# Patient Record
Sex: FEMALE | Race: WHITE | Employment: OTHER | URBAN - METROPOLITAN AREA
[De-identification: names, ages, dates, MRNs, and addresses within clinical notes are randomized per-mention and may not be internally consistent; named-entity substitution may affect disease eponyms.]

---

## 2018-01-11 ENCOUNTER — OFFICE VISIT (OUTPATIENT)
Dept: INTERNAL MEDICINE CLINIC | Facility: CLINIC | Age: 58
End: 2018-01-11

## 2018-01-11 VITALS
SYSTOLIC BLOOD PRESSURE: 118 MMHG | HEIGHT: 68 IN | DIASTOLIC BLOOD PRESSURE: 78 MMHG | BODY MASS INDEX: 33.34 KG/M2 | OXYGEN SATURATION: 94 % | WEIGHT: 220 LBS | HEART RATE: 98 BPM

## 2018-01-11 DIAGNOSIS — M25.552 PAIN OF LEFT HIP JOINT: ICD-10-CM

## 2018-01-11 DIAGNOSIS — K21.9 GASTROESOPHAGEAL REFLUX DISEASE, ESOPHAGITIS PRESENCE NOT SPECIFIED: ICD-10-CM

## 2018-01-11 DIAGNOSIS — M25.562 CHRONIC PAIN OF LEFT KNEE: ICD-10-CM

## 2018-01-11 DIAGNOSIS — R06.00 PND (PAROXYSMAL NOCTURNAL DYSPNEA): ICD-10-CM

## 2018-01-11 DIAGNOSIS — G89.29 CHRONIC PAIN OF LEFT KNEE: ICD-10-CM

## 2018-01-11 DIAGNOSIS — G43.109 MIGRAINE WITH AURA AND WITHOUT STATUS MIGRAINOSUS, NOT INTRACTABLE: ICD-10-CM

## 2018-01-11 DIAGNOSIS — R06.83 SNORING: ICD-10-CM

## 2018-01-11 DIAGNOSIS — R06.00 EXERTIONAL DYSPNEA: Primary | ICD-10-CM

## 2018-01-11 DIAGNOSIS — Z00.00 PREVENTATIVE HEALTH CARE: ICD-10-CM

## 2018-01-11 DIAGNOSIS — R53.83 OTHER FATIGUE: ICD-10-CM

## 2018-01-11 LAB
ALBUMIN SERPL BCP-MCNC: 4.1 G/DL (ref 3.5–4.8)
ALBUMIN/GLOB SERPL: 1.2 {RATIO} (ref 1–2)
ALP SERPL-CCNC: 104 U/L (ref 32–100)
ALT SERPL-CCNC: 20 U/L (ref 14–54)
ANION GAP SERPL CALC-SCNC: 6 MMOL/L (ref 0–18)
AST SERPL-CCNC: 23 U/L (ref 15–41)
BACTERIA UR QL AUTO: NEGATIVE /HPF
BASOPHILS # BLD: 0 K/UL (ref 0–0.2)
BASOPHILS NFR BLD: 0 %
BILIRUB SERPL-MCNC: 0.5 MG/DL (ref 0.3–1.2)
BILIRUB UR QL: NEGATIVE
BUN SERPL-MCNC: 13 MG/DL (ref 8–20)
BUN/CREAT SERPL: 12.5 (ref 10–20)
CALCIUM SERPL-MCNC: 9.5 MG/DL (ref 8.5–10.5)
CHLORIDE SERPL-SCNC: 107 MMOL/L (ref 95–110)
CHOLEST SERPL-MCNC: 190 MG/DL (ref 110–200)
CLARITY UR: CLEAR
CO2 SERPL-SCNC: 25 MMOL/L (ref 22–32)
COLOR UR: YELLOW
CREAT SERPL-MCNC: 1.04 MG/DL (ref 0.5–1.5)
EOSINOPHIL # BLD: 0.3 K/UL (ref 0–0.7)
EOSINOPHIL NFR BLD: 3 %
ERYTHROCYTE [DISTWIDTH] IN BLOOD BY AUTOMATED COUNT: 13.6 % (ref 11–15)
GLOBULIN PLAS-MCNC: 3.5 G/DL (ref 2.5–3.7)
GLUCOSE SERPL-MCNC: 95 MG/DL (ref 70–99)
GLUCOSE UR-MCNC: NEGATIVE MG/DL
HCT VFR BLD AUTO: 45.4 % (ref 35–48)
HDLC SERPL-MCNC: 41 MG/DL
HGB BLD-MCNC: 15 G/DL (ref 12–16)
HGB UR QL STRIP.AUTO: NEGATIVE
KETONES UR-MCNC: NEGATIVE MG/DL
LDLC SERPL CALC-MCNC: 110 MG/DL (ref 0–99)
LYMPHOCYTES # BLD: 1.9 K/UL (ref 1–4)
LYMPHOCYTES NFR BLD: 23 %
MCH RBC QN AUTO: 28.5 PG (ref 27–32)
MCHC RBC AUTO-ENTMCNC: 33 G/DL (ref 32–37)
MCV RBC AUTO: 86.3 FL (ref 80–100)
MONOCYTES # BLD: 0.8 K/UL (ref 0–1)
MONOCYTES NFR BLD: 9 %
NEUTROPHILS # BLD AUTO: 5.3 K/UL (ref 1.8–7.7)
NEUTROPHILS NFR BLD: 64 %
NITRITE UR QL STRIP.AUTO: NEGATIVE
NONHDLC SERPL-MCNC: 149 MG/DL
OSMOLALITY UR CALC.SUM OF ELEC: 286 MOSM/KG (ref 275–295)
PH UR: 5 [PH] (ref 5–8)
PLATELET # BLD AUTO: 173 K/UL (ref 140–400)
PMV BLD AUTO: 10.6 FL (ref 7.4–10.3)
POTASSIUM SERPL-SCNC: 4.3 MMOL/L (ref 3.3–5.1)
PROT SERPL-MCNC: 7.6 G/DL (ref 5.9–8.4)
PROT UR-MCNC: NEGATIVE MG/DL
RBC # BLD AUTO: 5.26 M/UL (ref 3.7–5.4)
RBC #/AREA URNS AUTO: 1 /HPF
SODIUM SERPL-SCNC: 138 MMOL/L (ref 136–144)
SP GR UR STRIP: 1.02 (ref 1–1.03)
TRIGL SERPL-MCNC: 196 MG/DL (ref 1–149)
TSH SERPL-ACNC: 3.24 UIU/ML (ref 0.45–5.33)
UROBILINOGEN UR STRIP-ACNC: <2
VIT C UR-MCNC: NEGATIVE MG/DL
WBC # BLD AUTO: 8.3 K/UL (ref 4–11)
WBC #/AREA URNS AUTO: 9 /HPF

## 2018-01-11 PROCEDURE — 36415 COLL VENOUS BLD VENIPUNCTURE: CPT | Performed by: INTERNAL MEDICINE

## 2018-01-11 PROCEDURE — 99386 PREV VISIT NEW AGE 40-64: CPT | Performed by: INTERNAL MEDICINE

## 2018-01-11 RX ORDER — PANTOPRAZOLE SODIUM 20 MG/1
20 TABLET, DELAYED RELEASE ORAL
Qty: 30 TABLET | Refills: 2 | Status: SHIPPED | OUTPATIENT
Start: 2018-01-11 | End: 2018-11-06

## 2018-01-11 RX ORDER — CALCIUM CARBONATE 200(500)MG
1 TABLET,CHEWABLE ORAL DAILY
COMMUNITY
End: 2018-02-20

## 2018-01-11 NOTE — PROGRESS NOTES
HPI:    Patient ID: Genia Zuluaga is a 62year old female. HPI   Pt comes in for first time to establish care. Patient is a daughter of patient of mine.   She comes in today with complaint of exertional dyspnea with occasional chest pain on and off is b Prolapse of mitral valve    • Ventricular tachycardia (HCC)       Past Surgical History:  No date: OTHER SURGICAL HISTORY      Comment: nose repair   Family History   Problem Relation Age of Onset   • Cancer Father       Social History: Smoking status: Nev Free T4 [E], specimen      Urinalysis, Routine, specimen    Meds This Visit:  Signed Prescriptions Disp Refills    Pantoprazole Sodium 20 MG Oral Tab EC 30 tablet 2      Sig: Take 1 tablet (20 mg total) by mouth every morning before breakfast.           Im

## 2018-01-25 ENCOUNTER — TELEPHONE (OUTPATIENT)
Dept: INTERNAL MEDICINE CLINIC | Facility: CLINIC | Age: 58
End: 2018-01-25

## 2018-01-29 ENCOUNTER — HOSPITAL ENCOUNTER (OUTPATIENT)
Dept: CV DIAGNOSTICS | Facility: HOSPITAL | Age: 58
Discharge: HOME | End: 2018-01-29
Attending: INTERNAL MEDICINE
Payer: COMMERCIAL

## 2018-01-29 ENCOUNTER — APPOINTMENT (OUTPATIENT)
Dept: CV DIAGNOSTICS | Facility: HOSPITAL | Age: 58
End: 2018-01-29
Attending: INTERNAL MEDICINE
Payer: COMMERCIAL

## 2018-01-29 DIAGNOSIS — R94.39 ABNORMAL STRESS ECHO: Primary | ICD-10-CM

## 2018-01-29 DIAGNOSIS — R06.00 DYSPNEA ON EXERTION: ICD-10-CM

## 2018-01-29 DIAGNOSIS — R06.00 DYSPNEA, PAROXYSMAL NOCTURNAL: ICD-10-CM

## 2018-01-29 PROCEDURE — 93018 CV STRESS TEST I&R ONLY: CPT | Performed by: INTERNAL MEDICINE

## 2018-01-29 PROCEDURE — 93350 STRESS TTE ONLY: CPT | Performed by: INTERNAL MEDICINE

## 2018-01-29 PROCEDURE — 93016 CV STRESS TEST SUPVJ ONLY: CPT | Performed by: INTERNAL MEDICINE

## 2018-01-29 PROCEDURE — 93017 CV STRESS TEST TRACING ONLY: CPT | Performed by: INTERNAL MEDICINE

## 2018-01-29 RX ORDER — SODIUM CHLORIDE 9 MG/ML
INJECTION, SOLUTION INTRAVENOUS
Status: COMPLETED
Start: 2018-01-29 | End: 2018-01-29

## 2018-01-29 RX ORDER — DOBUTAMINE HYDROCHLORIDE 100 MG/100ML
INJECTION INTRAVENOUS
Status: COMPLETED
Start: 2018-01-29 | End: 2018-01-29

## 2018-01-29 RX ADMIN — SODIUM CHLORIDE 120 ML: 9 INJECTION, SOLUTION INTRAVENOUS at 13:45:00

## 2018-01-29 RX ADMIN — DOBUTAMINE HYDROCHLORIDE: 100 INJECTION INTRAVENOUS at 14:37:00

## 2018-01-29 RX ADMIN — DOBUTAMINE HYDROCHLORIDE 30 MCG/KG/MIN: 100 INJECTION INTRAVENOUS at 13:45:00

## 2018-01-29 NOTE — PROGRESS NOTES
Spoke with patient, informed to follow up with cardio. Information for Dr. Moore Ek given to patient.

## 2018-01-29 NOTE — IMAGING NOTE
Pt here as an out pt for a dobutamine stress echo for c/o SOB. See test for full details. c/o chest pain 6(1-10) at peak infusion of 30 mcg/kg/min and B/P dropped from 213'R systolic to 713 systolic, HR dropped from 119 to 80's and  pt was pallor in color.

## 2018-02-20 PROBLEM — R94.39 ABNORMAL STRESS TEST: Status: ACTIVE | Noted: 2018-02-20

## 2018-03-05 ENCOUNTER — HOSPITAL ENCOUNTER (OUTPATIENT)
Dept: INTERVENTIONAL RADIOLOGY/VASCULAR | Facility: HOSPITAL | Age: 58
Discharge: HOME | End: 2018-03-05
Attending: INTERNAL MEDICINE
Payer: COMMERCIAL

## 2018-03-12 ENCOUNTER — HOSPITAL ENCOUNTER (OUTPATIENT)
Dept: INTERVENTIONAL RADIOLOGY/VASCULAR | Facility: HOSPITAL | Age: 58
Discharge: HOME | End: 2018-03-12
Attending: INTERNAL MEDICINE | Admitting: INTERNAL MEDICINE
Payer: COMMERCIAL

## 2018-03-12 VITALS
OXYGEN SATURATION: 92 % | HEART RATE: 66 BPM | BODY MASS INDEX: 33.34 KG/M2 | RESPIRATION RATE: 15 BRPM | DIASTOLIC BLOOD PRESSURE: 64 MMHG | HEIGHT: 68 IN | SYSTOLIC BLOOD PRESSURE: 108 MMHG | WEIGHT: 220 LBS

## 2018-03-12 DIAGNOSIS — R07.9 CHEST PAIN: ICD-10-CM

## 2018-03-12 DIAGNOSIS — R06.02 SOB (SHORTNESS OF BREATH): ICD-10-CM

## 2018-03-12 DIAGNOSIS — R94.39 ABNORMAL STRESS TEST: ICD-10-CM

## 2018-03-12 PROCEDURE — 4A023N7 MEASUREMENT OF CARDIAC SAMPLING AND PRESSURE, LEFT HEART, PERCUTANEOUS APPROACH: ICD-10-PCS | Performed by: INTERNAL MEDICINE

## 2018-03-12 PROCEDURE — 99153 MOD SED SAME PHYS/QHP EA: CPT

## 2018-03-12 PROCEDURE — 99152 MOD SED SAME PHYS/QHP 5/>YRS: CPT

## 2018-03-12 PROCEDURE — B2111ZZ FLUOROSCOPY OF MULTIPLE CORONARY ARTERIES USING LOW OSMOLAR CONTRAST: ICD-10-PCS | Performed by: INTERNAL MEDICINE

## 2018-03-12 PROCEDURE — B2151ZZ FLUOROSCOPY OF LEFT HEART USING LOW OSMOLAR CONTRAST: ICD-10-PCS | Performed by: INTERNAL MEDICINE

## 2018-03-12 PROCEDURE — 93458 L HRT ARTERY/VENTRICLE ANGIO: CPT

## 2018-03-12 RX ORDER — NITROGLYCERIN 20 MG/100ML
INJECTION INTRAVENOUS
Status: COMPLETED
Start: 2018-03-12 | End: 2018-03-12

## 2018-03-12 RX ORDER — SODIUM CHLORIDE 9 MG/ML
INJECTION, SOLUTION INTRAVENOUS
Status: DISPENSED
Start: 2018-03-12 | End: 2018-03-12

## 2018-03-12 RX ORDER — ASPIRIN 81 MG/1
TABLET, CHEWABLE ORAL
Status: DISPENSED
Start: 2018-03-12 | End: 2018-03-12

## 2018-03-12 RX ORDER — SODIUM CHLORIDE 9 MG/ML
INJECTION, SOLUTION INTRAVENOUS CONTINUOUS
Status: DISCONTINUED | OUTPATIENT
Start: 2018-03-12 | End: 2018-03-14

## 2018-03-12 RX ORDER — MIDAZOLAM HYDROCHLORIDE 1 MG/ML
INJECTION INTRAMUSCULAR; INTRAVENOUS
Status: COMPLETED
Start: 2018-03-12 | End: 2018-03-12

## 2018-03-12 RX ORDER — VERAPAMIL HYDROCHLORIDE 2.5 MG/ML
INJECTION, SOLUTION INTRAVENOUS
Status: COMPLETED
Start: 2018-03-12 | End: 2018-03-12

## 2018-03-12 RX ORDER — LIDOCAINE HYDROCHLORIDE 20 MG/ML
INJECTION, SOLUTION EPIDURAL; INFILTRATION; INTRACAUDAL; PERINEURAL
Status: COMPLETED
Start: 2018-03-12 | End: 2018-03-12

## 2018-03-12 RX ORDER — HEPARIN SODIUM 1000 [USP'U]/ML
INJECTION, SOLUTION INTRAVENOUS; SUBCUTANEOUS
Status: COMPLETED
Start: 2018-03-12 | End: 2018-03-12

## 2018-03-12 RX ORDER — SODIUM CHLORIDE 9 MG/ML
INJECTION, SOLUTION INTRAVENOUS CONTINUOUS
Status: ACTIVE | OUTPATIENT
Start: 2018-03-12 | End: 2018-03-12

## 2018-03-12 NOTE — PROGRESS NOTES
Outpatient Surgery Brief Discharge Summary         Patient ID:  Carlos Solorio  [id number]  62year old  10/23/1960    Discharge Diagnoses: cad  Procedures: l,lv,cor    Discharged Condition: stable    Disposition: home    Patient Instructions:  Follow-up wit

## 2018-03-12 NOTE — PROGRESS NOTES
Patient discharged home with discharge instructions. Notified her to call for follow up appointment with Dr. Paul Keyes in 1-2 weeks. Right wrist site no swelling, no bleeding noted. Right sling placed.

## 2018-04-19 ENCOUNTER — TELEPHONE (OUTPATIENT)
Dept: GASTROENTEROLOGY | Facility: CLINIC | Age: 58
End: 2018-04-19

## 2018-04-19 ENCOUNTER — OFFICE VISIT (OUTPATIENT)
Dept: GASTROENTEROLOGY | Facility: CLINIC | Age: 58
End: 2018-04-19

## 2018-04-19 VITALS
WEIGHT: 226 LBS | DIASTOLIC BLOOD PRESSURE: 70 MMHG | SYSTOLIC BLOOD PRESSURE: 107 MMHG | HEIGHT: 68 IN | HEART RATE: 83 BPM | BODY MASS INDEX: 34.25 KG/M2

## 2018-04-19 DIAGNOSIS — Z12.12 SCREENING FOR COLORECTAL CANCER: Primary | ICD-10-CM

## 2018-04-19 DIAGNOSIS — Z80.0 FAMILY HISTORY OF ESOPHAGEAL CANCER: ICD-10-CM

## 2018-04-19 DIAGNOSIS — Z83.79 FAMILY HISTORY OF BARRETT'S ESOPHAGUS: ICD-10-CM

## 2018-04-19 DIAGNOSIS — R12 HEARTBURN: ICD-10-CM

## 2018-04-19 DIAGNOSIS — Z12.11 SCREENING FOR COLON CANCER: Primary | ICD-10-CM

## 2018-04-19 DIAGNOSIS — Z12.11 SCREENING FOR COLORECTAL CANCER: Primary | ICD-10-CM

## 2018-04-19 PROCEDURE — 99244 OFF/OP CNSLTJ NEW/EST MOD 40: CPT | Performed by: INTERNAL MEDICINE

## 2018-04-19 RX ORDER — BUPROPION HYDROCHLORIDE 100 MG/1
TABLET, EXTENDED RELEASE ORAL
Refills: 0 | COMMUNITY
Start: 2018-02-10 | End: 2019-06-12

## 2018-04-19 NOTE — H&P
[facility], [facility] - Gastroenterology                                                                                                  Clinic History and Physical Rfl: 0   DiphenhydrAMINE HCl 50 MG Oral Tab Take 1 tablet 13 hours prior, 7 hours prior, and 1 hour prior to procedure Disp: 3 tablet Rfl: 0   Pantoprazole Sodium 20 MG Oral Tab EC Take 1 tablet (20 mg total) by mouth every morning before breakfast. Disp: pain, perforation, as well as the cardiopulmonary risks of anesthesia and all potentially leading to prolonged hospitalization or surgical intervention. I also mentioned the possibility of missed lesion.  All questions were answered to the patient’s satisfa

## 2018-04-19 NOTE — TELEPHONE ENCOUNTER
Scheduled for:  Colon/EGD  Provider Name: Leena Lema  Date:  6-11-18- Pt added for a Monday.  Is unable to get a ride any other day  Location:  [facility]  Sedation:  MAC  Time:  12:30pm, arrival 11:30am  Prep: Suprep  Meds/Allergies Reconciled?:  yes  Diagnosis with code

## 2018-04-19 NOTE — PATIENT INSTRUCTIONS
1. Schedule colonoscopy and upper endoscopy (EGD) with monitored anesthesia care (MAC) with Dr. John Lauren    2.  bowel prep from pharmacy ([facility] )    3. Continue all medications for procedure    4.  Read all bowel prep instructions carefully

## 2018-06-08 ENCOUNTER — TELEPHONE (OUTPATIENT)
Dept: GASTROENTEROLOGY | Facility: CLINIC | Age: 58
End: 2018-06-08

## 2018-06-08 DIAGNOSIS — Z80.0 FAMILY HISTORY OF ESOPHAGEAL CANCER: ICD-10-CM

## 2018-06-08 DIAGNOSIS — Z83.79 FAMILY HISTORY OF BARRETT'S ESOPHAGUS: ICD-10-CM

## 2018-06-08 DIAGNOSIS — Z12.11 COLON CANCER SCREENING: Primary | ICD-10-CM

## 2018-06-08 DIAGNOSIS — R12 HEARTBURN: ICD-10-CM

## 2018-06-08 NOTE — TELEPHONE ENCOUNTER
Pt stated nurse who stays with mother isn't able to stay with mother no one to watch her so pt has to cancel CLN/EGD 6/11/18.     Canceled in EPIC, Surgical Case Change Request sent, forwarded to GI schedulers     GI schedulers- Please reschedule    Talon Lima

## 2018-06-09 NOTE — TELEPHONE ENCOUNTER
Emily Kelley thank you for letting me know.  Yes please reschedule    Antonia Samson MD  [facility], [facility] - Gastroenterology  6/8/2018  8:39 PM

## 2018-06-12 NOTE — TELEPHONE ENCOUNTER
Scheduled for:  Colonoscopy [address] and EGD [address]  Provider Name: Dr. Robin Barbosa  Date:  8/14/18  Location:  [facility]  Sedation:  MAC  Time:  1723 (pt is aware to arrive at 1245)   Prep:  Suprep, mailed new instructions on 6/13/18  Meds/Allergies Reconciled?:  Hamzah

## 2018-08-06 ENCOUNTER — TELEPHONE (OUTPATIENT)
Dept: GASTROENTEROLOGY | Facility: CLINIC | Age: 58
End: 2018-08-06

## 2018-08-06 DIAGNOSIS — R12 HEARTBURN: ICD-10-CM

## 2018-08-06 DIAGNOSIS — Z83.79 FAMILY HISTORY OF BARRETT'S ESOPHAGUS: ICD-10-CM

## 2018-08-06 DIAGNOSIS — Z80.0 FAMILY HISTORY OF ESOPHAGEAL CANCER: ICD-10-CM

## 2018-08-06 DIAGNOSIS — Z12.11 COLON CANCER SCREENING: Primary | ICD-10-CM

## 2018-08-06 NOTE — TELEPHONE ENCOUNTER
Pt contacted and advised her to keep the appt on 8/14/18 d/t her medical hx, since Dr. Carole Daniel is booked out 1-2 months in advance. States she cannot come in on Tuesday's and is requesting a procedure on Monday's instead.  States she has no one to take care of h

## 2018-08-08 NOTE — TELEPHONE ENCOUNTER
This procedure was scheduled on a Monday in the EGD/EUS spot since this patient could only schedule on a Monday since she has coverage to take care of her ill mother only on Mondays    Scheduled for:  Colonoscopy [address] and EGD [id number]  Provider Name: Dr. Amy Gaines

## 2018-09-14 ENCOUNTER — TELEPHONE (OUTPATIENT)
Dept: GASTROENTEROLOGY | Facility: CLINIC | Age: 58
End: 2018-09-14

## 2018-09-14 DIAGNOSIS — R12 HEARTBURN: ICD-10-CM

## 2018-09-14 DIAGNOSIS — Z80.0 FAMILY HISTORY OF ESOPHAGEAL CANCER: ICD-10-CM

## 2018-09-14 DIAGNOSIS — Z12.11 COLON CANCER SCREENING: Primary | ICD-10-CM

## 2018-09-14 DIAGNOSIS — Z83.79 FAMILY HISTORY OF BARRETT'S ESOPHAGUS: ICD-10-CM

## 2018-09-14 NOTE — TELEPHONE ENCOUNTER
Pt called to cancel 9/17/18 colonoscopy. Pt does not want to reschedule at this time. No need to call unless questions.

## 2018-09-14 NOTE — TELEPHONE ENCOUNTER
Dr. Jose Eduardo Mijares    Pt contacted as she cancelled in the past for same reason. I advised her to keep the appt on 9/17/18 d/t her medical hx, since Dr. Juarez Call is booked out 1-2 months in advance.  States she cannot come in b/c her mother's other caretaker is unable to

## 2018-11-06 RX ORDER — PANTOPRAZOLE SODIUM 20 MG/1
TABLET, DELAYED RELEASE ORAL
Qty: 90 TABLET | Refills: 0 | Status: SHIPPED | OUTPATIENT
Start: 2018-11-06 | End: 2019-01-30

## 2018-12-27 ENCOUNTER — TELEPHONE (OUTPATIENT)
Dept: GASTROENTEROLOGY | Facility: CLINIC | Age: 58
End: 2018-12-27

## 2018-12-27 NOTE — TELEPHONE ENCOUNTER
----- Message from Lloyd Chaves RN sent at 2018 12:00 PM CDT -----  Regardin-3 month CLN/EGD recall  Recall CLN/EGD in 2-3 months d/t issues with caring for her mother.

## 2019-01-31 RX ORDER — PANTOPRAZOLE SODIUM 20 MG/1
TABLET, DELAYED RELEASE ORAL
Qty: 90 TABLET | Refills: 0 | Status: SHIPPED | OUTPATIENT
Start: 2019-01-31 | End: 2019-12-06

## 2019-04-12 ENCOUNTER — OFFICE VISIT (OUTPATIENT)
Dept: INTERNAL MEDICINE CLINIC | Facility: CLINIC | Age: 59
End: 2019-04-12
Payer: COMMERCIAL

## 2019-04-12 VITALS
HEART RATE: 79 BPM | WEIGHT: 220 LBS | OXYGEN SATURATION: 96 % | BODY MASS INDEX: 33.34 KG/M2 | HEIGHT: 68 IN | RESPIRATION RATE: 18 BRPM | TEMPERATURE: 98 F

## 2019-04-12 DIAGNOSIS — G89.29 CHRONIC PAIN OF LEFT KNEE: ICD-10-CM

## 2019-04-12 DIAGNOSIS — Z00.00 ANNUAL PHYSICAL EXAM: Primary | ICD-10-CM

## 2019-04-12 DIAGNOSIS — Z00.00 PREVENTATIVE HEALTH CARE: ICD-10-CM

## 2019-04-12 DIAGNOSIS — G43.109 MIGRAINE WITH AURA AND WITHOUT STATUS MIGRAINOSUS, NOT INTRACTABLE: ICD-10-CM

## 2019-04-12 DIAGNOSIS — M25.562 CHRONIC PAIN OF LEFT KNEE: ICD-10-CM

## 2019-04-12 DIAGNOSIS — F31.70 BIPOLAR DISORDER IN FULL REMISSION, MOST RECENT EPISODE UNSPECIFIED TYPE (HCC): ICD-10-CM

## 2019-04-12 DIAGNOSIS — K21.9 GASTROESOPHAGEAL REFLUX DISEASE, ESOPHAGITIS PRESENCE NOT SPECIFIED: ICD-10-CM

## 2019-04-12 DIAGNOSIS — Z12.31 SCREENING MAMMOGRAM, ENCOUNTER FOR: ICD-10-CM

## 2019-04-12 DIAGNOSIS — R41.3 MEMORY PROBLEM: ICD-10-CM

## 2019-04-12 PROCEDURE — 99396 PREV VISIT EST AGE 40-64: CPT | Performed by: INTERNAL MEDICINE

## 2019-04-12 RX ORDER — LAMOTRIGINE 100 MG/1
100 TABLET ORAL DAILY
Refills: 1 | COMMUNITY
Start: 2019-03-22

## 2019-04-12 RX ORDER — LURASIDONE HYDROCHLORIDE 80 MG/1
80 TABLET, FILM COATED ORAL NIGHTLY
Refills: 0 | COMMUNITY
Start: 2019-04-04

## 2019-04-12 RX ORDER — CLONAZEPAM 0.5 MG/1
TABLET ORAL 2 TIMES DAILY PRN
Refills: 1 | COMMUNITY
Start: 2019-03-21

## 2019-04-15 NOTE — PROGRESS NOTES
HPI:    Patient ID: Hilary Dodson is a 62year old female.     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  She appears w

## 2019-05-07 ENCOUNTER — OFFICE VISIT (OUTPATIENT)
Dept: INTERNAL MEDICINE CLINIC | Facility: CLINIC | Age: 59
End: 2019-05-07
Payer: COMMERCIAL

## 2019-05-07 VITALS
BODY MASS INDEX: 33.95 KG/M2 | SYSTOLIC BLOOD PRESSURE: 128 MMHG | OXYGEN SATURATION: 98 % | DIASTOLIC BLOOD PRESSURE: 80 MMHG | RESPIRATION RATE: 17 BRPM | WEIGHT: 224 LBS | HEART RATE: 97 BPM | TEMPERATURE: 99 F | HEIGHT: 68 IN

## 2019-05-07 DIAGNOSIS — G43.109 MIGRAINE WITH AURA AND WITHOUT STATUS MIGRAINOSUS, NOT INTRACTABLE: Primary | ICD-10-CM

## 2019-05-07 DIAGNOSIS — E66.9 CLASS 1 OBESITY WITHOUT SERIOUS COMORBIDITY WITH BODY MASS INDEX (BMI) OF 34.0 TO 34.9 IN ADULT, UNSPECIFIED OBESITY TYPE: ICD-10-CM

## 2019-05-07 DIAGNOSIS — Z00.00 EXAMINATION: ICD-10-CM

## 2019-05-07 DIAGNOSIS — R63.5 ABNORMAL WEIGHT GAIN: ICD-10-CM

## 2019-05-07 PROBLEM — E66.811 CLASS 1 OBESITY WITHOUT SERIOUS COMORBIDITY WITH BODY MASS INDEX (BMI) OF 34.0 TO 34.9 IN ADULT: Status: ACTIVE | Noted: 2019-05-07

## 2019-05-07 PROCEDURE — 99204 OFFICE O/P NEW MOD 45 MIN: CPT | Performed by: INTERNAL MEDICINE

## 2019-05-07 NOTE — PROGRESS NOTES
Genoveva Tristan is a 62year old female. Chief complaint:  weight loss management and obesity related comorbidities    HPI:     Genoveva Tristan is a 62year old female new to our office today.    with PMH as listed below here for weight management     Weight h EVERY MORNING BEFORE BREAKFAST Disp: 90 tablet Rfl: 0   BuPROPion HCl ER, SR, 100 MG Oral Tablet 12 Hr  Disp:  Rfl: 0   predniSONE 50 MG Oral Tab Take 1 tablet by mouth 13 hours prior, 7 hours prior, and 1 hour prior to procedure Disp: 3 tablet Rfl: 0   Na defined types were placed in this encounter. No orders of the defined types were placed in this encounter.       ASSESSMENT/PLAN:       (G43.109) Migraine with aura and without status migrainosus, not intractable  (primary encounter diagnosis)    (Z00.00 day  - Eat slowly   - Do not drink your calories (no regular pop, juice, high calorie coffee drinks, limit alcohol)  - Do not eat late at night  - Exercise- at least 3 times per week ( goal is 150 min/week)  -dietician referral: Yes  -Labs as ordered:  Yes

## 2019-05-07 NOTE — PATIENT INSTRUCTIONS
RAY Martínez to [facility]. We are excited that you are committed to improving your health and have invited our practice to be part of your journey.  Our approach to the medical management of weight loss is similar to that of other chr water per day, add fiber ( benefiber) to the water to increase fullness, overcome constipation    · Eat slowly    · Do not drink your calories ( no regular pop, juice, high calorie coffee drinks, limit alcohol) Also stay away from artificially sweetened be medicine by mouth with a glass of water. Follow the directions on the prescription label. Trokendi XR capsules must be swallowed whole. Do not sprinkle on food, break, crush, dissolve, or chew.  Qudexy XR capsules may be swallowed whole or opened and sprink medicine may also interact with the following medications:  · acetazolamide  · alcohol  · amitriptyline  · birth control pills  · digoxin  · hydrochlorothiazide  · lithium  · medicines for pain, sleep, or muscle relaxation  · metformin  · methazolamide  · hot baths, and saunas. Be careful about exercising, especially in hot weather. Contact your health care provider right away if you notice a fever or decrease in sweating. You should drink plenty of fluids while taking this medicine.  If you have had kidney enroll in the [address] [place] [address] Pregnancy Registry by calling [phone]. This registry collects information about the safety of antiepileptic drug use during pregnancy. NOTE:This sheet is a summary.  It may not cover all possible infor

## 2019-06-07 ENCOUNTER — OFFICE VISIT (OUTPATIENT)
Dept: INTERNAL MEDICINE CLINIC | Facility: CLINIC | Age: 59
End: 2019-06-07
Payer: COMMERCIAL

## 2019-06-07 VITALS
RESPIRATION RATE: 17 BRPM | BODY MASS INDEX: 33.49 KG/M2 | HEART RATE: 73 BPM | WEIGHT: 221 LBS | TEMPERATURE: 98 F | DIASTOLIC BLOOD PRESSURE: 75 MMHG | HEIGHT: 68 IN | SYSTOLIC BLOOD PRESSURE: 113 MMHG

## 2019-06-07 DIAGNOSIS — G89.29 CHRONIC PAIN OF LEFT KNEE: ICD-10-CM

## 2019-06-07 DIAGNOSIS — M25.562 CHRONIC PAIN OF LEFT KNEE: ICD-10-CM

## 2019-06-07 DIAGNOSIS — Z01.818 PRE-OP EXAMINATION: Primary | ICD-10-CM

## 2019-06-07 PROCEDURE — 93000 ELECTROCARDIOGRAM COMPLETE: CPT | Performed by: INTERNAL MEDICINE

## 2019-06-07 PROCEDURE — 36415 COLL VENOUS BLD VENIPUNCTURE: CPT | Performed by: INTERNAL MEDICINE

## 2019-06-07 PROCEDURE — 99214 OFFICE O/P EST MOD 30 MIN: CPT | Performed by: INTERNAL MEDICINE

## 2019-06-07 RX ORDER — TOPIRAMATE 25 MG/1
CAPSULE, EXTENDED RELEASE ORAL
Qty: 30 CAPSULE | Refills: 0 | Status: SHIPPED | OUTPATIENT
Start: 2019-06-07

## 2019-06-07 NOTE — PROGRESS NOTES
HPI:    Patient ID: Swapnil Izquierdo is a 62year old female.     HPI  Patient comes in today for preop for arthroscopic surgery to left knee denies any chest pain or shortness of breath patient had a recent cath about 1 year ago which was totally normal denmarilu Soft. Bowel sounds are normal.   Musculoskeletal: Normal range of motion. She exhibits tenderness. Left knee pain     Neurological: She is alert and oriented to person, place, and time. Skin: Skin is warm and dry. Nursing note and vitals reviewed.

## 2019-06-11 ENCOUNTER — TELEPHONE (OUTPATIENT)
Dept: INTERNAL MEDICINE CLINIC | Facility: CLINIC | Age: 59
End: 2019-06-11

## 2019-06-11 ENCOUNTER — OFFICE VISIT (OUTPATIENT)
Dept: INTERNAL MEDICINE CLINIC | Facility: CLINIC | Age: 59
End: 2019-06-11
Payer: COMMERCIAL

## 2019-06-11 VITALS
HEIGHT: 68 IN | WEIGHT: 219.19 LBS | TEMPERATURE: 98 F | DIASTOLIC BLOOD PRESSURE: 72 MMHG | SYSTOLIC BLOOD PRESSURE: 118 MMHG | RESPIRATION RATE: 17 BRPM | OXYGEN SATURATION: 98 % | BODY MASS INDEX: 33.22 KG/M2 | HEART RATE: 72 BPM

## 2019-06-11 DIAGNOSIS — G43.109 MIGRAINE WITH AURA AND WITHOUT STATUS MIGRAINOSUS, NOT INTRACTABLE: ICD-10-CM

## 2019-06-11 DIAGNOSIS — R63.5 ABNORMAL WEIGHT GAIN: ICD-10-CM

## 2019-06-11 DIAGNOSIS — Z51.81 THERAPEUTIC DRUG MONITORING: Primary | ICD-10-CM

## 2019-06-11 DIAGNOSIS — E66.9 CLASS 1 OBESITY WITHOUT SERIOUS COMORBIDITY WITH BODY MASS INDEX (BMI) OF 34.0 TO 34.9 IN ADULT, UNSPECIFIED OBESITY TYPE: ICD-10-CM

## 2019-06-11 PROCEDURE — 99213 OFFICE O/P EST LOW 20 MIN: CPT | Performed by: INTERNAL MEDICINE

## 2019-06-11 NOTE — PATIENT INSTRUCTIONS
Build Muscle & Lose Fat  The great fat vs muscle diagram below paints a clear picture of why it’s so important for you to build muscle in order to lose fat.   Maybe Erika Bell wondered about muscle vs fat and why you need to build muscle to lose fat, look slim 3. Build confidence. Strong muscles and joints increase your level of confidence in your abilities to perform many lifestyle activities. 4. Prevent injury.  Strength training can build stronger muscles and more limber, flexible joints, which play a crucial

## 2019-06-11 NOTE — PROGRESS NOTES
Anil Ramachandran is a 62year old female.     Chief complaint:weight loss follow up , medication refill, medication refill     HPI:   Reena Nevarez here for follow up on weight loss   Wt Readings from Last 12 Encounters:  06/11/19 : 219 lb 3.2 oz  06/07/19 : 221 lb  05 tablet Rfl: 0      Past Medical History:   Diagnosis Date   • Prolapse of mitral valve    • Ventricular tachycardia [facility])      Past Surgical History:   Procedure Laterality Date   • OTHER SURGICAL HISTORY      nose repair        Social History:  Social His total weight loss of 5 lbs # since first weight loss consult.   Labs reviewed  · Advised to continue trokendi but increase the dose to 50 mg   · Advised to increase exercise and add intensity goal is 40 min 4 times per week  · Start taking probiotics   · Co

## 2019-06-11 NOTE — TELEPHONE ENCOUNTER
Faxed over to Dr Dionne Brandt office Pre-Op Exam notes, along with labs and EKG. Received \"success\" confirmation.

## 2019-06-12 NOTE — H&P
ORTHO SURGERY H&P  Maxim Collins is a 62year old female. MRN is [id number].  Admitted: (Not on file)    CC: Left knee pain    HPI: Ms. Jessica Masterson is a 62year old female with a known history of left knee osteoarthritis who presents to the office complaining of Neurological: See HPI. All other systems reviewed and negative. Denies chest pain, shortness of breath at rest, fevers, chills, sweats, weight change, fatigue, malaise, abdominal pain, nausea, vomiting, diarrhea, dysuria, and hematuria.      Vital Signs lateral patellar facetectomy, and partial lateral meniscectomy.  Risks and benefits of surgery were discussed including but not limited to infection, joint stiffness, failure of the procedure, mechanical symptoms related to the surgery, blood clot, neurovas

## 2019-06-17 ENCOUNTER — HOSPITAL ENCOUNTER (OUTPATIENT)
Facility: HOSPITAL | Age: 59
Setting detail: HOSPITAL OUTPATIENT SURGERY
Discharge: HOME | End: 2019-06-17
Attending: ORTHOPAEDIC SURGERY | Admitting: ORTHOPAEDIC SURGERY
Payer: COMMERCIAL

## 2019-06-17 ENCOUNTER — ANESTHESIA EVENT (OUTPATIENT)
Dept: SURGERY | Facility: HOSPITAL | Age: 59
End: 2019-06-17
Payer: COMMERCIAL

## 2019-06-17 ENCOUNTER — ANESTHESIA (OUTPATIENT)
Dept: SURGERY | Facility: HOSPITAL | Age: 59
End: 2019-06-17
Payer: COMMERCIAL

## 2019-06-17 VITALS
TEMPERATURE: 98 F | SYSTOLIC BLOOD PRESSURE: 130 MMHG | RESPIRATION RATE: 18 BRPM | HEART RATE: 52 BPM | DIASTOLIC BLOOD PRESSURE: 72 MMHG | BODY MASS INDEX: 33.44 KG/M2 | OXYGEN SATURATION: 95 % | WEIGHT: 220.63 LBS | HEIGHT: 68 IN

## 2019-06-17 PROCEDURE — 0SBD4ZZ EXCISION OF LEFT KNEE JOINT, PERCUTANEOUS ENDOSCOPIC APPROACH: ICD-10-PCS | Performed by: ORTHOPAEDIC SURGERY

## 2019-06-17 RX ORDER — HYDROMORPHONE HYDROCHLORIDE 1 MG/ML
0.2 INJECTION, SOLUTION INTRAMUSCULAR; INTRAVENOUS; SUBCUTANEOUS EVERY 5 MIN PRN
Status: DISCONTINUED | OUTPATIENT
Start: 2019-06-17 | End: 2019-06-17

## 2019-06-17 RX ORDER — ACETAMINOPHEN 500 MG
1000 TABLET ORAL ONCE
Status: COMPLETED | OUTPATIENT
Start: 2019-06-17 | End: 2019-06-17

## 2019-06-17 RX ORDER — LIDOCAINE HYDROCHLORIDE 10 MG/ML
INJECTION, SOLUTION EPIDURAL; INFILTRATION; INTRACAUDAL; PERINEURAL AS NEEDED
Status: DISCONTINUED | OUTPATIENT
Start: 2019-06-17 | End: 2019-06-17 | Stop reason: SURG

## 2019-06-17 RX ORDER — MORPHINE SULFATE 2 MG/ML
2 INJECTION, SOLUTION INTRAMUSCULAR; INTRAVENOUS EVERY 10 MIN PRN
Status: DISCONTINUED | OUTPATIENT
Start: 2019-06-17 | End: 2019-06-17

## 2019-06-17 RX ORDER — ONDANSETRON 2 MG/ML
4 INJECTION INTRAMUSCULAR; INTRAVENOUS ONCE AS NEEDED
Status: COMPLETED | OUTPATIENT
Start: 2019-06-17 | End: 2019-06-17

## 2019-06-17 RX ORDER — ACETAMINOPHEN 500 MG
1000 TABLET ORAL ONCE
Status: DISCONTINUED | OUTPATIENT
Start: 2019-06-17 | End: 2019-06-17

## 2019-06-17 RX ORDER — HYDROMORPHONE HYDROCHLORIDE 1 MG/ML
0.6 INJECTION, SOLUTION INTRAMUSCULAR; INTRAVENOUS; SUBCUTANEOUS EVERY 5 MIN PRN
Status: DISCONTINUED | OUTPATIENT
Start: 2019-06-17 | End: 2019-06-17

## 2019-06-17 RX ORDER — SODIUM CHLORIDE, SODIUM LACTATE, POTASSIUM CHLORIDE, CALCIUM CHLORIDE 600; 310; 30; 20 MG/100ML; MG/100ML; MG/100ML; MG/100ML
INJECTION, SOLUTION INTRAVENOUS CONTINUOUS
Status: DISCONTINUED | OUTPATIENT
Start: 2019-06-17 | End: 2019-06-17

## 2019-06-17 RX ORDER — MORPHINE SULFATE 10 MG/ML
6 INJECTION, SOLUTION INTRAMUSCULAR; INTRAVENOUS EVERY 10 MIN PRN
Status: DISCONTINUED | OUTPATIENT
Start: 2019-06-17 | End: 2019-06-17

## 2019-06-17 RX ORDER — FAMOTIDINE 20 MG/1
20 TABLET ORAL ONCE
Status: COMPLETED | OUTPATIENT
Start: 2019-06-17 | End: 2019-06-17

## 2019-06-17 RX ORDER — PROCHLORPERAZINE EDISYLATE 5 MG/ML
5 INJECTION INTRAMUSCULAR; INTRAVENOUS ONCE AS NEEDED
Status: DISCONTINUED | OUTPATIENT
Start: 2019-06-17 | End: 2019-06-17

## 2019-06-17 RX ORDER — HYDROCODONE BITARTRATE AND ACETAMINOPHEN 5; 325 MG/1; MG/1
2 TABLET ORAL AS NEEDED
Status: DISCONTINUED | OUTPATIENT
Start: 2019-06-17 | End: 2019-06-17

## 2019-06-17 RX ORDER — HYDROMORPHONE HYDROCHLORIDE 1 MG/ML
0.4 INJECTION, SOLUTION INTRAMUSCULAR; INTRAVENOUS; SUBCUTANEOUS EVERY 5 MIN PRN
Status: DISCONTINUED | OUTPATIENT
Start: 2019-06-17 | End: 2019-06-17

## 2019-06-17 RX ORDER — HYDROCODONE BITARTRATE AND ACETAMINOPHEN 5; 325 MG/1; MG/1
1 TABLET ORAL AS NEEDED
Qty: 10 TABLET | Refills: 0 | Status: SHIPPED | OUTPATIENT
Start: 2019-06-17

## 2019-06-17 RX ORDER — ONDANSETRON 2 MG/ML
INJECTION INTRAMUSCULAR; INTRAVENOUS AS NEEDED
Status: DISCONTINUED | OUTPATIENT
Start: 2019-06-17 | End: 2019-06-17 | Stop reason: SURG

## 2019-06-17 RX ORDER — FAMOTIDINE 20 MG/1
20 TABLET ORAL ONCE
Status: DISCONTINUED | OUTPATIENT
Start: 2019-06-17 | End: 2019-06-17

## 2019-06-17 RX ORDER — NALOXONE HYDROCHLORIDE 0.4 MG/ML
80 INJECTION, SOLUTION INTRAMUSCULAR; INTRAVENOUS; SUBCUTANEOUS AS NEEDED
Status: DISCONTINUED | OUTPATIENT
Start: 2019-06-17 | End: 2019-06-17

## 2019-06-17 RX ORDER — BUPIVACAINE HYDROCHLORIDE AND EPINEPHRINE 5; 5 MG/ML; UG/ML
INJECTION, SOLUTION PERINEURAL AS NEEDED
Status: DISCONTINUED | OUTPATIENT
Start: 2019-06-17 | End: 2019-06-17 | Stop reason: HOSPADM

## 2019-06-17 RX ORDER — MORPHINE SULFATE 4 MG/ML
4 INJECTION, SOLUTION INTRAMUSCULAR; INTRAVENOUS EVERY 10 MIN PRN
Status: DISCONTINUED | OUTPATIENT
Start: 2019-06-17 | End: 2019-06-17

## 2019-06-17 RX ORDER — DEXAMETHASONE SODIUM PHOSPHATE 4 MG/ML
VIAL (ML) INJECTION AS NEEDED
Status: DISCONTINUED | OUTPATIENT
Start: 2019-06-17 | End: 2019-06-17 | Stop reason: SURG

## 2019-06-17 RX ORDER — MIDAZOLAM HYDROCHLORIDE 1 MG/ML
INJECTION INTRAMUSCULAR; INTRAVENOUS AS NEEDED
Status: DISCONTINUED | OUTPATIENT
Start: 2019-06-17 | End: 2019-06-17 | Stop reason: SURG

## 2019-06-17 RX ORDER — CEFAZOLIN SODIUM/WATER 2 G/20 ML
2 SYRINGE (ML) INTRAVENOUS ONCE
Status: COMPLETED | OUTPATIENT
Start: 2019-06-17 | End: 2019-06-17

## 2019-06-17 RX ORDER — HYDROCODONE BITARTRATE AND ACETAMINOPHEN 5; 325 MG/1; MG/1
1 TABLET ORAL AS NEEDED
Status: DISCONTINUED | OUTPATIENT
Start: 2019-06-17 | End: 2019-06-17

## 2019-06-17 RX ADMIN — ONDANSETRON 4 MG: 2 INJECTION INTRAMUSCULAR; INTRAVENOUS at 09:32:00

## 2019-06-17 RX ADMIN — SODIUM CHLORIDE, SODIUM LACTATE, POTASSIUM CHLORIDE, CALCIUM CHLORIDE: 600; 310; 30; 20 INJECTION, SOLUTION INTRAVENOUS at 10:56:00

## 2019-06-17 RX ADMIN — LIDOCAINE HYDROCHLORIDE 50 MG: 10 INJECTION, SOLUTION EPIDURAL; INFILTRATION; INTRACAUDAL; PERINEURAL at 09:32:00

## 2019-06-17 RX ADMIN — MIDAZOLAM HYDROCHLORIDE 2 MG: 1 INJECTION INTRAMUSCULAR; INTRAVENOUS at 09:32:00

## 2019-06-17 RX ADMIN — CEFAZOLIN SODIUM/WATER 2 G: 2 G/20 ML SYRINGE (ML) INTRAVENOUS at 09:42:00

## 2019-06-17 RX ADMIN — DEXAMETHASONE SODIUM PHOSPHATE 4 MG: 4 MG/ML VIAL (ML) INJECTION at 09:32:00

## 2019-06-17 NOTE — ANESTHESIA PREPROCEDURE EVALUATION
Anesthesia PreOp Note    HPI:     Emelia Swanson is a 62year old female who presents for preoperative consultation requested by: Payal Preston MD    Date of Surgery: 6/17/2019    Procedure(s):  KNEE ARTHROSCOPY  Indication: left knee lateral eniscus t 24 Hr Take 1 capsule by mouth daily.  Disp: 90 capsule Rfl: 0 6/14/2019       Current Facility-Administered Medications Ordered in Epic:  lactated ringers infusion  Intravenous Continuous Arsalan Smith MD Last Rate: 20 mL/hr at 06/17/19 0906   ceFAZoli abused: Not on file        Forced sexual activity: Not on file    Other Topics      Concerns:        Not on file    Social History Narrative      Not on file      Available pre-op labs reviewed.   Lab Results   Component Value Date    WBC 8.1 06/07/2019 answered to the best of my ability. The patient desires the anesthetic management as planned.   Joanne MUNIZ  6/17/2019 9:18 AM

## 2019-06-17 NOTE — OPERATIVE REPORT
Operative Note    Patient Name: Carolyn Resendiz    Preoperative Diagnosis: left knee lateral eniscus tear    Postoperative Diagnosis: left knee lateral eniscus tear    Primary Surgeon: Elvia Dobbs MD     Assistant: Vidal Sharma [facility]    Procedures: Left k

## 2019-06-17 NOTE — ANESTHESIA POSTPROCEDURE EVALUATION
Patient:  Maddy Horowitz    Procedure Summary     Date:  06/17/19 Room / Location:  [facility] OR [address] [place] [address] OR    Anesthesia Start:  8362 Anesthesia Stop:  2768    Procedure:  KNEE ARTHROSCOPY (Left Knee) Diagnosis:  (left knee lateral eniscus tear)    Brice

## 2019-06-17 NOTE — ANESTHESIA PROCEDURE NOTES
Airway  Urgency: elective      General Information and Staff    Patient location during procedure: OR  Anesthesiologist: Dez Logan MD  Performed: anesthesiologist     Indications and Patient Condition  Indications for airway management: anesthesia

## 2019-06-17 NOTE — OPERATIVE REPORT
[facility]    PATIENT'S NAME: Franki Mcneal   ATTENDING PHYSICIAN: Mark Dover MD   OPERATING PHYSICIAN: Mark Dover MD   PATIENT ACCOUNT#:   [id number]    LOCATION:  [facility] [address] PACU 9 [facility] 10  MEDICAL RECORD #:   [id number]       LAI partial thickness articular cartilage loss over the more superolateral patellar facet. There was fibrillation over the mid aspect distally.   The femoral trochlea had diffuse grade 2 degenerative change with fibrillation throughout, but no full or partial extremity. Next, the anterior portals were established. A superolateral outflow cannula was inserted. The camera was inserted through the lateral portal, and a thorough examination of the knee joint was performed. The findings were as stated.   Next, us

## 2019-07-16 ENCOUNTER — OFFICE VISIT (OUTPATIENT)
Dept: INTERNAL MEDICINE CLINIC | Facility: CLINIC | Age: 59
End: 2019-07-16
Payer: COMMERCIAL

## 2019-07-16 VITALS
HEART RATE: 82 BPM | BODY MASS INDEX: 32.34 KG/M2 | SYSTOLIC BLOOD PRESSURE: 112 MMHG | DIASTOLIC BLOOD PRESSURE: 78 MMHG | RESPIRATION RATE: 16 BRPM | OXYGEN SATURATION: 99 % | WEIGHT: 213.38 LBS | HEIGHT: 68 IN

## 2019-07-16 DIAGNOSIS — Z51.81 THERAPEUTIC DRUG MONITORING: Primary | ICD-10-CM

## 2019-07-16 DIAGNOSIS — R63.5 ABNORMAL WEIGHT GAIN: ICD-10-CM

## 2019-07-16 DIAGNOSIS — G43.109 MIGRAINE WITH AURA AND WITHOUT STATUS MIGRAINOSUS, NOT INTRACTABLE: ICD-10-CM

## 2019-07-16 DIAGNOSIS — E66.9 CLASS 1 OBESITY WITHOUT SERIOUS COMORBIDITY WITH BODY MASS INDEX (BMI) OF 34.0 TO 34.9 IN ADULT, UNSPECIFIED OBESITY TYPE: ICD-10-CM

## 2019-07-16 PROCEDURE — 99213 OFFICE O/P EST LOW 20 MIN: CPT | Performed by: INTERNAL MEDICINE

## 2019-07-16 NOTE — PROGRESS NOTES
Emelia Swanson is a 62year old female.     Chief complaint:weight loss follow up , medication refill, therapeutic drug monitoring    HPI:   Betty Chandlerks here for follow up on weight loss   Wt Readings from Last 12 Encounters:  07/16/19 : 213 lb 6.4 oz  06/17/19 : 2 Date   • Anxiety state    • Bipolar affective ([facility] [address]    • Depression    • Esophageal reflux    • Migraines    • Osteoarthritis    • Prolapse of mitral valve    • Ventricular tachycardia (HCC)    • Visual impairment     wears glasses     Past Surgical Histor Z68.34) Class 1 obesity without serious comorbidity with body mass index (BMI) of 34.0 to 34.9 in adult, unspecified obesity type    (R63.5) Abnormal weight gain    (G43.109) Migraine with aura and without status migrainosus, not intractable    Plan:  · Pa

## 2019-08-14 ENCOUNTER — OFFICE VISIT (OUTPATIENT)
Dept: INTERNAL MEDICINE CLINIC | Facility: CLINIC | Age: 59
End: 2019-08-14
Payer: COMMERCIAL

## 2019-08-14 VITALS
WEIGHT: 211 LBS | DIASTOLIC BLOOD PRESSURE: 78 MMHG | RESPIRATION RATE: 17 BRPM | HEART RATE: 79 BPM | BODY MASS INDEX: 31.98 KG/M2 | OXYGEN SATURATION: 98 % | TEMPERATURE: 98 F | HEIGHT: 68 IN | SYSTOLIC BLOOD PRESSURE: 102 MMHG

## 2019-08-14 DIAGNOSIS — R63.5 ABNORMAL WEIGHT GAIN: ICD-10-CM

## 2019-08-14 DIAGNOSIS — F31.9 BIPOLAR AFFECTIVE DISORDER, REMISSION STATUS UNSPECIFIED (HCC): ICD-10-CM

## 2019-08-14 DIAGNOSIS — Z51.81 THERAPEUTIC DRUG MONITORING: Primary | ICD-10-CM

## 2019-08-14 DIAGNOSIS — G43.109 MIGRAINE WITH AURA AND WITHOUT STATUS MIGRAINOSUS, NOT INTRACTABLE: ICD-10-CM

## 2019-08-14 DIAGNOSIS — E66.9 CLASS 1 OBESITY WITHOUT SERIOUS COMORBIDITY WITH BODY MASS INDEX (BMI) OF 34.0 TO 34.9 IN ADULT, UNSPECIFIED OBESITY TYPE: ICD-10-CM

## 2019-08-14 PROCEDURE — 99213 OFFICE O/P EST LOW 20 MIN: CPT | Performed by: INTERNAL MEDICINE

## 2019-08-14 NOTE — PROGRESS NOTES
Kumar Franklin is a 62year old female.     Chief complaint:weight loss follow up , medication refill, therapeutic drug monitoring    HPI:   Minh Gonsales here for follow up on weight loss   Wt Readings from Last 12 Encounters:  08/14/19 : 211 lb  07/16/19 : 213 lb 6 valve    • Ventricular tachycardia (HCC)    • Visual impairment     wears glasses     Past Surgical History:   Procedure Laterality Date   • KNEE ARTHROSCOPY Left 6/17/2019    Performed by Irish Doan MD at [facility] OR   • [address] [place] [address] and without status migrainosus, not intractable    (F31.9) Bipolar affective disorder, [facility] status unspecified [facility]    Plan:  · Patient has lost 2 lbs # since LOV 1 month ago.  Patient has lost a total weight loss of 13 lbs # since first weight loss c

## 2019-08-14 NOTE — PATIENT INSTRUCTIONS
Aerobic Exercise   What is aerobic exercise? Any form of repetitive, rhythmic exercise that uses your large muscles, makes you breathe faster, and gets your heart going is aerobic exercise.  When you are doing aerobic exercise, your lungs work harder to b exercise? Any regular, moderate activity, such as three 10-minute walks a day, reduces your risk of death from heart disease. Special classes are not necessary, but they can be lots of fun.  They can be a good way to get started if you've never done much least 30 minutes. You can also use your target heart rate to check your progress over time. To calculate your maximum heart rate, subtract your age from 26. Your target heart rate is between 60% and 85% of your maximum heart rate.    If you exercise mode

## 2019-08-15 PROBLEM — F31.9 BIPOLAR DISORDER (HCC): Status: ACTIVE | Noted: 2019-08-15

## 2019-11-19 ENCOUNTER — TELEPHONE (OUTPATIENT)
Dept: INTERNAL MEDICINE CLINIC | Facility: CLINIC | Age: 59
End: 2019-11-19

## 2019-11-19 RX ORDER — METHYLPREDNISOLONE 4 MG/1
TABLET ORAL
Qty: 1 KIT | Refills: 0 | Status: SHIPPED | OUTPATIENT
Start: 2019-11-19

## 2019-12-06 RX ORDER — PANTOPRAZOLE SODIUM 20 MG/1
TABLET, DELAYED RELEASE ORAL
Qty: 90 TABLET | Refills: 1 | Status: SHIPPED | OUTPATIENT
Start: 2019-12-06

## 2019-12-06 NOTE — TELEPHONE ENCOUNTER
Refill passed per [facility] protocol.   Refill Protocol Appointment Criteria  · Appointment scheduled in the past 6 months or in the next 3 months  Recent Outpatient Visits            3 months ago Therapeutic drug monitoring    [facility]

## 2019-12-12 ENCOUNTER — NURSE TRIAGE (OUTPATIENT)
Dept: INTERNAL MEDICINE CLINIC | Facility: CLINIC | Age: 59
End: 2019-12-12

## 2019-12-12 RX ORDER — MUPIROCIN CALCIUM 20 MG/G
1 CREAM TOPICAL 2 TIMES DAILY
Qty: 15 G | Refills: 0 | Status: SHIPPED | OUTPATIENT
Start: 2019-12-12 | End: 2019-12-26

## 2019-12-12 NOTE — TELEPHONE ENCOUNTER
Action Requested: Summary for Provider     []  Critical Lab, Recommendations Needed  [x] Need Additional Advice  []   FYI    []   Need Orders  [x] Need Medications Sent to Pharmacy  []  Other     SUMMARY: Patient reports rash to face x 4-6 weeks.  Rash star

## 2020-07-15 ENCOUNTER — TELEPHONE (OUTPATIENT)
Dept: INTERNAL MEDICINE CLINIC | Facility: CLINIC | Age: 60
End: 2020-07-15

## 2021-01-28 ENCOUNTER — TELEPHONE (OUTPATIENT)
Dept: INTERNAL MEDICINE CLINIC | Facility: CLINIC | Age: 61
End: 2021-01-28

## 2021-01-28 NOTE — TELEPHONE ENCOUNTER
Called patient, no answer. Left VM to call us back to schedule an annual px, Also left message on [employer].

## 2021-05-28 NOTE — TELEPHONE ENCOUNTER
Called patient, no answer. Left VM to call us back to an annual physical. Pt still has not read [facility] as of yet, sent pt letter home instead.

## 2025-03-25 ENCOUNTER — OFFICE VISIT (OUTPATIENT)
Dept: INTERNAL MEDICINE CLINIC | Facility: CLINIC | Age: 65
End: 2025-03-25
Payer: MEDICARE

## 2025-03-25 VITALS
HEIGHT: 67 IN | DIASTOLIC BLOOD PRESSURE: 80 MMHG | WEIGHT: 208.63 LBS | OXYGEN SATURATION: 95 % | BODY MASS INDEX: 32.74 KG/M2 | SYSTOLIC BLOOD PRESSURE: 116 MMHG | HEART RATE: 73 BPM

## 2025-03-25 DIAGNOSIS — R79.9 ABNORMAL BLOOD CHEMISTRY: ICD-10-CM

## 2025-03-25 DIAGNOSIS — G47.33 OSA (OBSTRUCTIVE SLEEP APNEA): Primary | ICD-10-CM

## 2025-03-25 DIAGNOSIS — E66.811 CLASS 1 OBESITY WITHOUT SERIOUS COMORBIDITY WITH BODY MASS INDEX (BMI) OF 34.0 TO 34.9 IN ADULT, UNSPECIFIED OBESITY TYPE: ICD-10-CM

## 2025-03-25 LAB — HEMOGLOBIN A1C: 5.4 % (ref 4.3–5.6)

## 2025-03-25 PROCEDURE — 83036 HEMOGLOBIN GLYCOSYLATED A1C: CPT | Performed by: INTERNAL MEDICINE

## 2025-03-25 PROCEDURE — 99203 OFFICE O/P NEW LOW 30 MIN: CPT | Performed by: INTERNAL MEDICINE

## 2025-03-25 RX ORDER — TIRZEPATIDE 5 MG/.5ML
5 INJECTION, SOLUTION SUBCUTANEOUS WEEKLY
Qty: 6 ML | Refills: 0 | Status: SHIPPED | OUTPATIENT
Start: 2025-03-25 | End: 2025-06-11

## 2025-03-25 RX ORDER — TRAZODONE HYDROCHLORIDE 50 MG/1
50 TABLET ORAL NIGHTLY
COMMUNITY
Start: 2024-03-27

## 2025-03-25 RX ORDER — TIRZEPATIDE 2.5 MG/.5ML
2.5 INJECTION, SOLUTION SUBCUTANEOUS WEEKLY
Qty: 2 ML | Refills: 0 | Status: SHIPPED | OUTPATIENT
Start: 2025-03-25 | End: 2025-04-16

## 2025-03-25 RX ORDER — CELECOXIB 100 MG/1
1 CAPSULE ORAL EVERY 12 HOURS
COMMUNITY
Start: 2024-09-24

## 2025-03-25 RX ORDER — ALBUTEROL SULFATE 90 UG/1
2 INHALANT RESPIRATORY (INHALATION)
COMMUNITY

## 2025-03-25 NOTE — PATIENT INSTRUCTIONS
RAY Martínez to [facility]. We are excited that you are committed to improving your health and have invited our practice to be part of your journey. Our approach to the medical management of weight loss is similar to that of other chronic diseases, like asthma or high blood pressure. Treatment is tailored to your needs and may look different than someone else in our program. Weight-loss success is dependent on many factors, including your motivation and commitment to better health.      We are committed to your medical safety, particularly when prescribing weight-loss medications. Because we are physicians, we measure your success not only by “pounds lost” - we will also track improvements in your laboratory work, vital signs and quality of life.      Weight loss and maintenance can be a challenging endeavor. We want to celebrate your successes and support you if you encounter difficult times. Please don’t hesitate to ask us questions and share with us any struggles you may have. For some patients, there may be many attempts at weight loss before lasting success is found, but we can help you find your success!      Sincerely,     Michael Fleming MD  American Board of Obesity Medicine Diplomate  American Board of Internal Medicine Diplomate                                                                  Weight Loss Tips    Cut down on sugar and starches.    Ok to eat healthy fat ( avocado, nuts,eggs, olive oil and coconut oil)    Eat protein with each meal target 15-30 G of protein with each meal: Protein reduces appetite, cravings and hunger. It increases muscle mass and subsequently metabolism and fat burning.     If not able to meet your protein need, you can get a protein shake. Choose one with around 25 g of protein and low carb less than 5 g ( e.g: premier protein, orgain Clean Protein, whey protein muscle milk)    Limit carbohydrates between 50g-100g / day    Limit processed food, eat  unprocessed food whenever you can.    Read nutrition labels    Drink a lot of water  at least 65 oz of water per day: Water is a natural appetite suppressant, increases calorie burning and help you to loose weight.    Eat slowly.    Do not drink your calories ( avoid soft drinks, juice, high calorie coffee drinks, limit alcohol) Also stay away from artificially sweetened beverages too it can cause weight gain.    Think about challenges and write it down to address it next visit.     Do not eat late at night.      Exercise goal for weight loss is 150 minutes per week.    Take a probiotic everyday ( e.g: carvajal colon health brand, culturelle, VSL3, Lactobacillus Gasseri )     Use smart phone applications to track your progress/ carb intake e.g:( my fitnesspal, loose it, Carb Manager )    Have a good night sleep aim for 7-8 hours    Reduce your stress level.    Helpful websites: www.dietIGIGI.Xquva( search visual low-carb guides dietdoctor), or www.ZeusControls.Xquva.    Helpful exercise apps( Meograph makenzie)   Helpful fasting apps :( Zero)         Foods to avoid :  Sugar: sweets, ice cream, fruit juices, candy and food that is high in added sugar.  Highly processed food  Refined grains: Rice, wheat, bread, cereal and pasta.  Starchy vegetables: Potatoes and corn     Low Carb food :  Meat: lamb, steak, chicken, turkey  Fish and sea food   Eggs  Plain yogurt   Cheese   Fat and oils   Fruits: berries are the best           How I Plan to Lose Weight      Goal setting is the “how” of weight loss. Motivators are the “why.” When setting goals, utilize the SMART technique:    SMART Technique Example   Specific Who, what, where, when, how… “I want to lose 10 pounds in two months.”   Measureable How will you track? 10 pounds in 8 weeks = 1.25 pounds/week   Attainable Resources you have available, previous experience “I have been able to do this before, and now I have new tools from my doctor!”   Relevant Why this goal is important  Review your motivators above   Timely Set benchmarks and deadlines “Focusing for two month intervals works for me.”         Even if your lose 0.5 pound per week You will still lose 26                       pounds by this time next year!

## 2025-03-25 NOTE — PROGRESS NOTES
Ray Horowitz is a 64 year old female.    Chief complaint:weight loss follow up , medication refill, therapeutic drug monitoring    HPI:   RAY here for follow up on weight loss   Wt Readings from Last 12 Encounters:   03/25/25 208 lb 9.6 oz (94.6 kg)   08/14/19 211 lb (95.7 kg)   07/16/19 213 lb 6.4 oz (96.8 kg)   06/17/19 220 lb 9.6 oz (100.1 kg)   06/11/19 219 lb 3.2 oz (99.4 kg)   06/07/19 221 lb (100.2 kg)   05/07/19 224 lb (101.6 kg)   04/12/19 220 lb (99.8 kg)   06/13/18 223 lb (101.2 kg)   04/19/18 226 lb (102.5 kg)   03/08/18 220 lb (99.8 kg)   02/20/18 220 lb (99.8 kg)     Starting weight: 224 lbs     Total weight loss: 16 lbs   Medication: none     Typical diet   Breakfast: Lunch: Dinner: Snacks:   Skips breakfast   Oatmeal sometimes in the morning before she goes to work  Smoothies with greek yogurt and berries     Has been eating mediterranean diet   Sometimes salads   Chicken   Tuna      Tuna   Steak   Chickpea pasta   Once in a while gilmar donuts    Donuts     Cheese  Peanut butter   The lowest weight was 165 lbs   She is still doing caregiving         Soda/ juice/ alcohol: no soda   No alcohol     Water intake: adequate  Exercise: 2ce per week swimming and walking     Challenges: meal planning is hard     Side effect of medication: NA       She is only sleeping for 3 hours     S/p knee replacement         She is taking topamax 100 mg once daily     STAN on cpap           Current Outpatient Medications   Medication Sig Dispense Refill    albuterol 108 (90 Base) MCG/ACT Inhalation Aero Soln Inhale 2 puffs into the lungs.      Omeprazole 20 MG Oral Tab EC Take 20 mg by mouth daily.      celecoxib 100 MG Oral Cap Take 1 capsule (100 mg total) by mouth Q12H.      traZODone 50 MG Oral Tab Take 1 tablet (50 mg total) by mouth nightly.      PANTOPRAZOLE SODIUM 20 MG Oral Tab EC TAKE 1 TABLET(20 MG) BY MOUTH EVERY MORNING BEFORE BREAKFAST 90 tablet 1    methylPREDNISolone (MEDROL) 4 MG Oral Tablet Therapy  Pack As directed. 1 kit 0    HYDROcodone-acetaminophen 5-325 MG Oral Tab Take 1 tablet by mouth as needed. 10 tablet 0    TROKENDI XR 25 MG Oral Capsule SR 24 Hr TAKE 1 CAPSULE BY MOUTH DAILY 30 capsule 0    lamoTRIgine 100 MG Oral Tab Take 1 tablet (100 mg total) by mouth daily.  1    LATUDA 80 MG Oral Tab 1 tablet (80 mg total) nightly.  0    clonazePAM 0.5 MG Oral Tab 2 (two) times daily as needed.    1      Past Medical History:    Anxiety state    Bipolar affective (HCC)    Depression    Esophageal reflux    Migraines    Osteoarthritis    Prolapse of mitral valve    Ventricular tachycardia (HCC)    Visual impairment    wears glasses     Past Surgical History:   Procedure Laterality Date    Other      PARTIALCOLON SURGERY    Other      OVARIAN CYST REMOVAL    Other surgical history      nose repair        Social History:  Social History     Socioeconomic History    Marital status:    Tobacco Use    Smoking status: Never    Smokeless tobacco: Never   Substance and Sexual Activity    Alcohol use: No    Drug use: No    Sexual activity: Never     Partners: Male     Social Drivers of Health     Food Insecurity: No Food Insecurity (9/23/2024)    Received from [facility]    Hunger Vital Sign     Worried About Running Out of Food in the Last Year: Never true     Ran Out of Food in the Last Year: Never true   Transportation Needs: No Transportation Needs (9/23/2024)    Received from [facility]    PRAPARE - Transportation     Lack of Transportation (Medical): No     Lack of Transportation (Non-Medical): No   Housing Stability: Unknown (9/23/2024)    Received from [facility]    Housing Stability Vital Sign     Unable to Pay for Housing in the Last Year: No        Family History   Problem Relation Age of Onset    Cancer Father     Diabetes Mother     Heart Disorder Mother     Other (esophageal cancer) Brother      Patient Active Problem List    Diagnosis    Chest pain    Dyspnea    Abnormal stress test    Class 1 obesity without serious comorbidity with body mass index (BMI) of 34.0 to 34.9 in adult    Migraine with aura and without status migrainosus, not intractable    Bipolar disorder (HCC)               REVIEW OF SYSTEMS:   A comprehensive 10 point review of systems was completed.  Pertinent positives and negatives noted in the the HPI          PHYSICAL EXAM:   /80   Pulse 73   Ht 5' 7\" (1.702 m)   Wt 208 lb 9.6 oz (94.6 kg)   SpO2 95%   BMI 32.67 kg/m²   GENERAL: well developed, well nourished,in no apparent distress  LUNGS: clear to auscultation  CARDIO: RRR without murmur  NEURO: no gross deficits              Orders Placed This Encounter    albuterol 108 (90 Base) MCG/ACT Inhalation Aero Soln     Sig: Inhale 2 puffs into the lungs.    Omeprazole 20 MG Oral Tab EC     Sig: Take 20 mg by mouth daily.    celecoxib 100 MG Oral Cap     Sig: Take 1 capsule (100 mg total) by mouth Q12H.    traZODone 50 MG Oral Tab     Sig: Take 1 tablet (50 mg total) by mouth nightly.         ASSESSMENT/PLAN:       ICD-10-CM    1. STAN (obstructive sleep apnea)  G47.33 albuterol 108 (90 Base) MCG/ACT Inhalation Aero Soln     Omeprazole 20 MG Oral Tab EC     celecoxib 100 MG Oral Cap     traZODone 50 MG Oral Tab     Tirzepatide-Weight Management (ZEPBOUND) 2.5 MG/0.5ML Subcutaneous Solution Auto-injector     Tirzepatide-Weight Management (ZEPBOUND) 5 MG/0.5ML Subcutaneous Solution Auto-injector     POC Glycohemoglobin [80187]      2. Class 1 obesity without serious comorbidity with body mass index (BMI) of 34.0 to 34.9 in adult, unspecified obesity type  E66.811 albuterol 108 (90 Base) MCG/ACT Inhalation Aero Soln    Z68.34 Omeprazole 20 MG Oral Tab EC     celecoxib 100 MG Oral Cap     traZODone 50 MG Oral Tab     Tirzepatide-Weight Management (ZEPBOUND) 2.5 MG/0.5ML Subcutaneous Solution Auto-injector     Tirzepatide-Weight Management (ZEPBOUND) 5 MG/0.5ML  Subcutaneous Solution Auto-injector     POC Glycohemoglobin [75961]      3. Abnormal blood chemistry  R79.9 albuterol 108 (90 Base) MCG/ACT Inhalation Aero Soln     Omeprazole 20 MG Oral Tab EC     celecoxib 100 MG Oral Cap     traZODone 50 MG Oral Tab     Tirzepatide-Weight Management (ZEPBOUND) 2.5 MG/0.5ML Subcutaneous Solution Auto-injector     Tirzepatide-Weight Management (ZEPBOUND) 5 MG/0.5ML Subcutaneous Solution Auto-injector     POC Glycohemoglobin [24701]         Plan:  Patient has lost and gained # since LOV. Patient has lost a total weight loss of 16 lbs # since first weight loss consult.  Labs reviewed  Advised the patient to follow low carb high protein diet   Advised to count carbs goal carbs is  100 g per day   Advised to increase protein goal is 90 g per day   Can add protein shakes   Increase water intake goal is 64 oz per day   Increase exercise cardio and weight resistance training, discussed the importance of exercise for weight loss goal is 150 min per week   don't eat at late night   Given meal plan   Discussed resources for low carb meal   Discussed the importance of sleep and reducing stress for weight loss  Given low carb meal plan   Goal weight loss is 10 lbs in 3 months   Poc Hba1c is normal   Starting zepbound for STAN and obesity discussed black box warning and side effects. No family history of thyroid cancer. No history of pancreatitis    Discussed side effects and shown the patient how to use the pen   No contrave due to history of bipolar   No phentermine since with history of abnormal cardiac testing         Plan:  Nutrition: low carb diet   Referral RD/nutritionist : no  Behavior:  Motivational interviewing performed      Discussed strategies to overcome habits/challenges       Reviewed:  Nutrition and the importance of regular protein intake  Labs ordered:    no  Treatment plan     I spent 30 minutes at the day of the service seeing the patient, examination, reviewing labs,  independently interpreting results, completing charting and counseling the patient and/or on coordination of care.  The diagnosis, prognosis, and general treatment was explained to the patient.   Please return to the clinic if you are having persistent or worsening symptoms   Michael Fleming MD,   Diplomate of the American Board of Internal Medicine  Diplomate of the American Board of Obesity Medicine

## 2025-03-27 ENCOUNTER — TELEPHONE (OUTPATIENT)
Dept: INTERNAL MEDICINE CLINIC | Facility: CLINIC | Age: 65
End: 2025-03-27

## 2025-04-29 ENCOUNTER — TELEPHONE (OUTPATIENT)
Dept: INTERNAL MEDICINE CLINIC | Facility: CLINIC | Age: 65
End: 2025-04-29

## 2025-04-29 NOTE — TELEPHONE ENCOUNTER
Called [facility] on coverage PA for Zepbound.  No name on file but, transferred to [facility].     Spoke to Rep for [facility] regarding patient's coverage for Zepbound. Rep stated patient's insurance plan does not cover weight loss medication including Zepbound (Not Part of Formulary Plan). Rep stated could fax over PA forms to initiate.     Rep faxed PA forms for Zepbound.     Will keep a look out for PA forms.   HILARIO GRIMES

## 2025-04-29 NOTE — TELEPHONE ENCOUNTER
Patient called and is very upset that she received a bill for her previous visit, when all she received was paperwork telling her what she can eat, exercise more, when she already does that.    She is waiting for a response from MA regarding the Zepbound.  I did advised that the MA did leave a message on her mychart to reach out to insurance and see if there is a PA Form to fill out..  Patient works full time and states that she should not be the one calling insurance, it is not her job.

## 2025-04-30 NOTE — TELEPHONE ENCOUNTER
Patient called the office back again today.  She called Medicare on the back of her insurance card.  She said it only took 6 minutes and she learned Zepbound isn't covered.  Medicare will cover the cost of Ozempic and Maunjaro.    Patient isn't happy it took a month to determine Zepbound won't be covered.    And she feels she shouldn't have to hunt down the information needed to get medication filled, that she was told she needed to do.      Patient wants to know if she needs to get the diagnosis code to get the mediation approved.    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

## 2025-04-30 NOTE — TELEPHONE ENCOUNTER
Prior I did called Medicare insurance for Zepbound coverage, no answer & transferring me to different numbers, unresolved problem.   Called [facility] yesterday if patient is approved for Zepbound, then Rep transferred me to [facility] due to her insurance pharmacy plan.     Rep from [facility] stated Zepbound is not cover under insurance plan, but decided to fax PA forms, either way processing the PA Zepbound will still not get approved. I faxed over PA forms today for proof once received denied PA for Zepbound.     Ozempic & Mounjaro needs to have diagnosis of type 2 DM, patient is not diabetic, A1C is 5.4.   Patient will need to pay out of pocket for weight loss medication or discuss other options.     Patient stated does not care for  for weight loss medications.    Still waiting on payer response.  HILARIO GRIMES

## 2025-04-30 NOTE — TELEPHONE ENCOUNTER
Received fax from [facility] approved for Zepbound 2.5mg.     Approval for 04/16/2025 until further notice. Approved through Medicare Part D benefit.     Notify patient.   HILARIO GRIMES

## 2025-05-08 ENCOUNTER — TELEPHONE (OUTPATIENT)
Dept: INTERNAL MEDICINE CLINIC | Facility: CLINIC | Age: 65
End: 2025-05-08

## 2025-05-08 NOTE — TELEPHONE ENCOUNTER
Patient called back to send Zepbound to pharmacy.    Called pharmacy to confirm Zepbound refilled, pharmacist stated will filled it by Friday or Monday.    Called patient and informed medication will be filled by Friday or Monday.  Then, patient started to complain of how it took this long for her medication to be refilled and stated \"she is a patient that she should not be calling insurances\" and \"is considering to look for a different doctor for new weight loss.\"   I explained to her the process of doing the prior authorization and patient was not understanding or listening to me.     Patient stated \"she does not like me, only Dr. Fleming.\" Then hung up the phone.     HILARIO GRIMES

## (undated) DEVICE — ENCORE® LATEX ACCLAIM SIZE 8, STERILE LATEX POWDER-FREE SURGICAL GLOVE: Brand: ENCORE

## (undated) DEVICE — TUBING SET, GRAVITY, 4-SPIKE

## (undated) DEVICE — ABDOMINAL PAD: Brand: CURITY

## (undated) DEVICE — SOL  .9 3000ML

## (undated) DEVICE — NEEDLE HPO 18GA 1.5IN ECLPS

## (undated) DEVICE — SUTURE VICRYL 4-0 J494G

## (undated) DEVICE — GOWN SURG AERO BLUE PERF LG

## (undated) DEVICE — ARTHROSCOPY: Brand: MEDLINE INDUSTRIES, INC.

## (undated) DEVICE — BLADE SHVR 13CM 4MM EXCLBR

## (undated) DEVICE — 3M™ STERI-STRIP™ REINFORCED ADHESIVE SKIN CLOSURES, R1547, 1/2 IN X 4 IN (12 MM X 100 MM), 6 STRIPS/ENVELOPE: Brand: 3M™ STERI-STRIP™

## (undated) DEVICE — 6 ML SYRINGE LUER-LOCK TIP: Brand: MONOJECT

## (undated) DEVICE — AMBIENT SUPER TURBOVAC 90 IFS: Brand: COBLATION

## (undated) DEVICE — 3M™ IOBAN™ 2 ANTIMICROBIAL INCISE DRAPE 6650EZ: Brand: IOBAN™ 2

## (undated) DEVICE — 3M™ STERI-STRIP™ COMPOUND BENZOIN TINCTURE 40 BAGS/CARTON 4 CARTONS/CASE C1544: Brand: 3M™ STERI-STRIP™

## (undated) DEVICE — SUCTION CANISTER, 3000CC,SAFELINER: Brand: DEROYAL

## (undated) DEVICE — 12 ML SYRINGE LUER-LOCK TIP: Brand: MONOJECT

## (undated) DEVICE — ZIMMER® STERILE DISPOSABLE TOURNIQUET CUFF WITH PLC, DUAL PORT, SINGLE BLADDER, 34 IN. (86 CM)

## (undated) NOTE — LETTER
[address], [place]  Authorization for Invasive Procedures  1.  I hereby authorize Dr. Eliseo Wolf, my physician and whomever may be designated as the doctor's assistant, to perform the following operation and/or procedure:  Cardiac cath 5. I consent to the photographing of the operations or procedures to be performed for the purposes of advancing medicine, science, and/or education, provided my identity is not revealed.  If the procedure has been videotaped, the physician/surgeon will obta __________ Time: ___________    Statement of Physician  My signature below affirms that prior to the time of the procedure, I have explained to the patient and/or her legal representative, the risks and benefits involved in the proposed treatment and any r

## (undated) NOTE — LETTER
[facility], [place]  [address]  [facility]  Dept: [phone]    12/27/2018        [facility]         [place]  [place] [address]             Dear Jamarcus Pinedo records indica